# Patient Record
Sex: FEMALE | Race: BLACK OR AFRICAN AMERICAN
[De-identification: names, ages, dates, MRNs, and addresses within clinical notes are randomized per-mention and may not be internally consistent; named-entity substitution may affect disease eponyms.]

---
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
## 2018-04-25 ENCOUNTER — HOSPITAL ENCOUNTER (OUTPATIENT)
Dept: HOSPITAL 44 - RAD | Age: 18
End: 2018-04-25
Attending: PHYSICIAN ASSISTANT
Payer: COMMERCIAL

## 2018-04-25 DIAGNOSIS — M54.41: Primary | ICD-10-CM

## 2018-04-25 PROCEDURE — 72100 X-RAY EXAM L-S SPINE 2/3 VWS: CPT

## 2018-04-25 NOTE — DIAGNOSTIC IMAGING REPORT
NARENDRA MALDONADO 

[facility]

[address] [facility] [address] [place] [address]. [id number]

 

 

 

 

Report Submission Date: 2018 10:41:48 AM CDT

Patient       Study

Name:   ERIC LEDESMA       Date:   2018 9:56:45 AM CDT

MRN:   [id number]       Modality Type:   DX

Gender:   F       Description:   SPINE

:   89       Institution:   [facility]

Physician:   NARENDRA MALDONADO

     Accession:    [id number]

 

 

Examination: Plain film lumbar spine 



History: L-SPINE, RT SIDED LOW BACK PAIN X2 MONTHS, RADIATES TO THE KNEE, NO 
KNOWN INJURY (Hx) 



Findings: 3 views of the lumbar spine demonstrate normal height.  No anterior 
compression. No soft tissue abnormalities. 



Impression: No acute osseous process.

 

Electronically signed on 2018 10:41:48 AM CDT by:

Arpan PAVON

## 2018-06-20 ENCOUNTER — HOSPITAL ENCOUNTER (OUTPATIENT)
Dept: HOSPITAL 44 - LAB | Age: 18
End: 2018-06-20
Attending: OPTOMETRIST
Payer: COMMERCIAL

## 2018-06-20 DIAGNOSIS — R73.03: ICD-10-CM

## 2018-06-20 DIAGNOSIS — H53.8: Primary | ICD-10-CM

## 2018-06-20 PROCEDURE — 36415 COLL VENOUS BLD VENIPUNCTURE: CPT

## 2018-06-20 PROCEDURE — 82947 ASSAY GLUCOSE BLOOD QUANT: CPT

## 2018-08-29 ENCOUNTER — HOSPITAL ENCOUNTER (OUTPATIENT)
Dept: HOSPITAL 44 - LABRHC | Age: 18
End: 2018-08-29
Attending: PHYSICIAN ASSISTANT
Payer: COMMERCIAL

## 2018-08-29 DIAGNOSIS — R35.0: Primary | ICD-10-CM

## 2018-08-29 PROCEDURE — 87086 URINE CULTURE/COLONY COUNT: CPT

## 2019-08-27 ENCOUNTER — HOSPITAL ENCOUNTER (OUTPATIENT)
Dept: HOSPITAL 44 - LABRHC | Age: 19
End: 2019-08-27
Attending: FAMILY MEDICINE
Payer: COMMERCIAL

## 2019-08-27 DIAGNOSIS — R30.0: Primary | ICD-10-CM

## 2019-08-27 PROCEDURE — 87086 URINE CULTURE/COLONY COUNT: CPT
